# Patient Record
(demographics unavailable — no encounter records)

---

## 2024-10-22 NOTE — ASSESSMENT
[FreeTextEntry1] : 1. AR/AC -  Fexofenadine 180, Fluticasone nasal, Azelastine nasal, Azelastine opthalmic, montelukast 10mg. - immunocap only positive to tree pollen.   2. Urticaria -resolved - Lab w/u negative  3. HEIDI - she does not respond fully to allergy medication and does not have significant allergies to explain year round sx. trial ipratropium nasal  4. Xerosis - dry skin may be contributing to itchiness.

## 2024-10-22 NOTE — HISTORY OF PRESENT ILLNESS
[de-identified] : RONDA MANJARREZ is a 63 year old female w/ AR/AC who was taking immunotherapy shots for a few years she stopped taking them back in September 2023 due to insurance issues. Ever since she stopped, she has been feeling nasally congested, watery eyes, itchy throat, ears, and a mild cough, she has noticed that she started to get pain around her nose and under her eyes causing a headache. She states that the immunotherapy shots helped keep all her symptoms under control. She is currently taking Fexofenadine to treat her symptoms. She has asthma and is allergic to Morphine. She is here today to restart her immunotherapy shot treatment again.   She is here today for labs follow up, she says she is still taking all medications as prescribed, she sees slight improvement in her symptoms, but they are still present she still experiences itchy skin, sneezing, nasal congestion and runny nose.

## 2025-03-25 NOTE — HISTORY OF PRESENT ILLNESS
[de-identified] : RONDA MANJARREZ is a 64 year old female w/ AR/AC who was taking immunotherapy shots for a few years she stopped taking them back in September 2023 due to insurance issues. Ever since she stopped, she has been feeling nasally congested, watery eyes, itchy throat, ears, and a mild cough, she has noticed that she started to get pain around her nose and under her eyes causing a headache. She states that the immunotherapy shots helped keep all her symptoms under control. She is currently taking Fexofenadine to treat her symptoms. She has asthma and is allergic to Morphine.   She is here today for a six month follow up, she says has been taking all medication as prescribed, with the change of weather she began to experience itchiness on her face, throat, postnasal drip and sneezing. She wants to know how to treat her symptoms for the upcoming spring season.